# Patient Record
Sex: FEMALE | Employment: STUDENT | URBAN - METROPOLITAN AREA
[De-identification: names, ages, dates, MRNs, and addresses within clinical notes are randomized per-mention and may not be internally consistent; named-entity substitution may affect disease eponyms.]

---
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
## 2018-11-21 ENCOUNTER — OFFICE VISIT (OUTPATIENT)
Dept: OBGYN CLINIC | Facility: CLINIC | Age: 18
End: 2018-11-21
Payer: COMMERCIAL

## 2018-11-21 ENCOUNTER — TELEPHONE (OUTPATIENT)
Dept: OBGYN CLINIC | Facility: CLINIC | Age: 18
End: 2018-11-21

## 2018-11-21 VITALS
SYSTOLIC BLOOD PRESSURE: 118 MMHG | BODY MASS INDEX: 30.65 KG/M2 | HEART RATE: 88 BPM | HEIGHT: 66.5 IN | WEIGHT: 193 LBS | DIASTOLIC BLOOD PRESSURE: 60 MMHG

## 2018-11-21 DIAGNOSIS — Z01.419 WELL WOMAN EXAM WITH ROUTINE GYNECOLOGICAL EXAM: Primary | ICD-10-CM

## 2018-11-21 DIAGNOSIS — Z11.3 SCREEN FOR STD (SEXUALLY TRANSMITTED DISEASE): ICD-10-CM

## 2018-11-21 PROCEDURE — 87591 N.GONORRHOEAE DNA AMP PROB: CPT | Performed by: OBSTETRICS & GYNECOLOGY

## 2018-11-21 PROCEDURE — 99385 PREV VISIT NEW AGE 18-39: CPT | Performed by: OBSTETRICS & GYNECOLOGY

## 2018-11-21 PROCEDURE — 87491 CHLMYD TRACH DNA AMP PROBE: CPT | Performed by: OBSTETRICS & GYNECOLOGY

## 2018-11-21 NOTE — TELEPHONE ENCOUNTER
Per pt she saw dr Fatuma Nj today and they talked about starting bc but she never got her prescription sent to her pharmacy. Please advise and call pt.  Thanks

## 2018-11-21 NOTE — PROGRESS NOTES
Leonard Arreola is a 19year old female [facility] Patient's last menstrual period was 11/07/2018 (approximate). Patient presents with:  Wellness Visit  .      She has trouble remembering to take the birth control pills options were discussed she wishes to try Yes        Self-Exams: Not Asked    Social History Narrative      Not on file      FAMILY HISTORY:  Family History   Problem Relation Age of Onset   • Breast Cancer Mother    • Arthritis Father    • No Known Problems Maternal Grandmother    • No Known Prob

## 2019-07-16 ENCOUNTER — WALK IN (OUTPATIENT)
Dept: URGENT CARE | Age: 19
End: 2019-07-16

## 2019-07-16 VITALS
TEMPERATURE: 98.1 F | RESPIRATION RATE: 16 BRPM | BODY MASS INDEX: 30.53 KG/M2 | SYSTOLIC BLOOD PRESSURE: 110 MMHG | WEIGHT: 190 LBS | HEIGHT: 66 IN | DIASTOLIC BLOOD PRESSURE: 68 MMHG | HEART RATE: 80 BPM

## 2019-07-16 DIAGNOSIS — Z00.8 ENCOUNTER FOR PHYSICAL EXAMINATION OF STUDENT: Primary | ICD-10-CM

## 2019-07-16 DIAGNOSIS — Z11.1 SCREENING-PULMONARY TB: Primary | ICD-10-CM

## 2019-07-16 PROCEDURE — 86580 TB INTRADERMAL TEST: CPT | Performed by: NURSE PRACTITIONER

## 2019-07-16 PROCEDURE — X0945 SELF PAY APN OR PA PERFORMED ADMINISTRATIVE PHYSICAL: HCPCS | Performed by: NURSE PRACTITIONER

## 2019-07-16 ASSESSMENT — VISUAL ACUITY
OD_CC: 20/20
OS_CC: 20/20

## 2019-07-19 ENCOUNTER — WALK IN (OUTPATIENT)
Dept: URGENT CARE | Age: 19
End: 2019-07-19

## 2019-07-19 DIAGNOSIS — Z11.1 ENCOUNTER FOR PPD SKIN TEST READING: Primary | ICD-10-CM

## 2019-07-19 LAB
INDURATION: 0 MM (ref 0–?)
SKIN TEST RESULT: NEGATIVE

## 2019-07-19 PROCEDURE — X1094 NO CHARGE VISIT: HCPCS | Performed by: OBSTETRICS & GYNECOLOGY

## 2021-04-01 ENCOUNTER — TELEPHONE (OUTPATIENT)
Dept: SCHEDULING | Age: 21
End: 2021-04-01

## (undated) NOTE — MR AVS SNAPSHOT
[facility]  [address], [facility] [address]  [address] [id number]               Thank you for choosing us for your health care visit with Charis Gonzalez MD.  We are glad to serve you and happy to provide you with this sum Current Medications          This list is accurate as of: 6/22/17  3:54 PM.  Always use your most recent med list.                Norethindrone-Eth Estradiol 1-35 MG-MCG Tabs   Take 1 tablet by mouth daily.    Commonly known as:  Aaron Zhao 1/35 (28)

## (undated) NOTE — ED AVS SNAPSHOT
[facility] Emergency Department    [facility]  [address] [address]    Phone:  [phone]    Fax:  [phone]   MRN: [id number]    Department:  [facility] Emergency Department   Date of Visit:  3/11 Click [email or web address]      Or call [phone]    If you have any problems with your follow-up, please call our  at [phone]    Si usted tiene algun problema con murillo sequimiento, por favor llame a nuestro adminstrador de casos al ([facility]        Pharmacy Address Phone Number   [address] ([address]) [place] [address].  (Laurie Martin COMPARISON:  None. INDICATIONS:  shoulder dislocatio     PATIENT STATED HISTORY:  Patient plays water polo and today a player hit her and she felt her arm \"pop\". Then it popped back. Sore and achey with ROM, but can move arm with no difficulty.

## (undated) NOTE — LETTER
March 11, 2017    Patient: Alphonsus Severance   Date of Visit: 3/11/2017       To Whom It May Concern:    Lakesha Zamora was seen and treated in our emergency department on 3/11/2017.  She may return to school with no contact sports or gym until cleared by